# Patient Record
Sex: FEMALE | Race: WHITE | NOT HISPANIC OR LATINO | Employment: OTHER | URBAN - METROPOLITAN AREA
[De-identification: names, ages, dates, MRNs, and addresses within clinical notes are randomized per-mention and may not be internally consistent; named-entity substitution may affect disease eponyms.]

---

## 2020-02-26 ENCOUNTER — APPOINTMENT (OUTPATIENT)
Dept: WOMENS IMAGING | Facility: HOSPITAL | Age: 60
End: 2020-02-26

## 2020-02-26 PROCEDURE — 77063 BREAST TOMOSYNTHESIS BI: CPT | Performed by: RADIOLOGY

## 2020-02-26 PROCEDURE — 77067 SCR MAMMO BI INCL CAD: CPT | Performed by: RADIOLOGY

## 2021-04-24 ENCOUNTER — APPOINTMENT (OUTPATIENT)
Dept: GENERAL RADIOLOGY | Facility: HOSPITAL | Age: 61
End: 2021-04-24

## 2021-04-24 ENCOUNTER — HOSPITAL ENCOUNTER (EMERGENCY)
Facility: HOSPITAL | Age: 61
Discharge: HOME | End: 2021-04-24
Attending: EMERGENCY MEDICINE | Admitting: EMERGENCY MEDICINE

## 2021-04-24 VITALS
RESPIRATION RATE: 16 BRPM | SYSTOLIC BLOOD PRESSURE: 114 MMHG | HEIGHT: 67 IN | HEART RATE: 95 BPM | TEMPERATURE: 97.2 F | DIASTOLIC BLOOD PRESSURE: 74 MMHG | OXYGEN SATURATION: 99 % | WEIGHT: 205 LBS | BODY MASS INDEX: 32.18 KG/M2

## 2021-04-24 DIAGNOSIS — S40.022A ARM CONTUSION, LEFT, INITIAL ENCOUNTER: Primary | ICD-10-CM

## 2021-04-24 DIAGNOSIS — T07.XXXA MULTIPLE ABRASIONS: ICD-10-CM

## 2021-04-24 DIAGNOSIS — S20.212A RIB CONTUSION, LEFT, INITIAL ENCOUNTER: ICD-10-CM
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
## 2021-04-24 PROCEDURE — 73090 X-RAY EXAM OF FOREARM: CPT

## 2021-04-24 PROCEDURE — 73030 X-RAY EXAM OF SHOULDER: CPT

## 2021-04-24 PROCEDURE — 71101 X-RAY EXAM UNILAT RIBS/CHEST: CPT

## 2021-04-24 PROCEDURE — 99283 EMERGENCY DEPT VISIT LOW MDM: CPT

## 2021-04-24 NOTE — ED PROVIDER NOTES
EMERGENCY DEPARTMENT ENCOUNTER    Room Number:  10/10  Date of encounter:  4/24/2021  PCP: Saw West MD  Historian: Patient      HPI:  Chief Complaint: Fall  A complete HPI/ROS/PMH/PSH/SH/FH are unobtainable due to: None    Context: Kinga Gonzalez is a 60 y.o. female who presents to the ED c/o fall.  Patient states that she fell today after tripping while outside doing some gardening.  She rolled down 6 stairs.  She complains of pain exclusively in the left side of her body, namely her left shoulder, forearm, and ribs.  Pain is constant and mild.  Worse with movement.  She denies having any pain to her head although she does think that she hit it.  No vomiting.  Takes no blood thinners.  She has been ambulatory.  Tetanus vaccine is up-to-date.      PAST MEDICAL HISTORY  Active Ambulatory Problems     Diagnosis Date Noted   • No Active Ambulatory Problems     Resolved Ambulatory Problems     Diagnosis Date Noted   • No Resolved Ambulatory Problems     No Additional Past Medical History         PAST SURGICAL HISTORY  History reviewed. No pertinent surgical history.      FAMILY HISTORY  History reviewed. No pertinent family history.      SOCIAL HISTORY  Social History     Socioeconomic History   • Marital status: Single     Spouse name: Not on file   • Number of children: Not on file   • Years of education: Not on file   • Highest education level: Not on file   Tobacco Use   • Smoking status: Never Smoker   • Smokeless tobacco: Never Used   Vaping Use   • Vaping Use: Never used   Substance and Sexual Activity   • Alcohol use: Yes   • Drug use: Never   • Sexual activity: Defer         ALLERGIES  Penicillins        REVIEW OF SYSTEMS  Review of Systems     All systems reviewed and negative except for those discussed in HPI.       PHYSICAL EXAM    I have reviewed the triage vital signs and nursing notes.    ED Triage Vitals   Temp Heart Rate Resp BP SpO2   04/24/21 1004 04/24/21 1004 04/24/21 1004 04/24/21  1113 04/24/21 1004   97.2 °F (36.2 °C) 102 16 128/85 98 %      Temp src Heart Rate Source Patient Position BP Location FiO2 (%)   -- -- 04/24/21 1113 04/24/21 1113 --     Sitting Left arm        Physical Exam  GENERAL: not distressed  HENT: nares patent, scalp is atraumatic  GCS 15  No palpable skull fracture  No hemotympanum bilaterally  No Ely's sign  No raccoon eyes  No CSF rhinorrhea or otorrhea   EOMI  No c spine tenderness to palpation  Full range of motion of neck  EYES: no scleral icterus  CV: regular rhythm, regular rate  RESPIRATORY: normal effort, clear to auscultation bilaterally  ABDOMEN: soft, nontender  MUSCULOSKELETAL: no deformity, minimal pain of the patient of the left shoulder and left forearm, she has full range of motion with good strength of the left wrist on flexion and extension.  She has full range of motion to the left elbow with flexion, extension, pronation, and supination.  She additionally has full range of motion to her left shoulder.  No pain outpatient of the right upper extremity and bilateral lower Extremities.  No C/T/L-spine tenderness.  NEURO: alert, moves all extremities, follows commands  SKIN: Abrasions to the left toes as well as to the right hand including some distal nail involvement of the right index finger        LAB RESULTS  No results found for this or any previous visit (from the past 24 hour(s)).    Ordered the above labs and independently reviewed the results.        RADIOLOGY  XR Shoulder 2+ View Left, XR Forearm 2 View Left, XR Ribs Left With PA Chest    Result Date: 4/24/2021  The chest and left rib series, left arm, left shoulder  HISTORY left rib pain, extremity pain.  CHEST: AP view of the chest and AP and oblique views of the left hemithorax were obtained. The heart is within normal limits in size. There is no evidence of focal infiltrate, effusion, congestive failure or of pneumothorax. AP and oblique views of the left hemithorax with no evidence of a  displaced rib fracture.  Left forearm: 2 views left forearm shows no evidence of fracture or dislocation.  Left shoulder: 2 views left shoulder show no evidence of fracture or dislocation.  This report was finalized on 4/24/2021 11:25 AM by Dr. Tc López M.D.        I ordered the above noted radiological studies. Reviewed by me and discussed with radiologist.  See dictation for official radiology interpretation.      PROCEDURES    Procedures      MEDICATIONS GIVEN IN ER    Medications - No data to display      PROGRESS, DATA ANALYSIS, CONSULTS, AND MEDICAL DECISION MAKING    All labs have been independently reviewed by me.  All radiology studies have been reviewed by me and discussed with radiologist dictating the report.   EKG's independently viewed and interpreted by me.  Discussion below represents my analysis of pertinent findings related to patient's condition, differential diagnosis, treatment plan and final disposition.    Patient presents after a fall.     X-ray of the left forearm and shoulder interpreted by myself.  No evidence of fracture.    ED Course as of Apr 24 1205   Sat Apr 24, 2021   1157 I reexamined the patient prior to discharge and repeated a secondary survey.  She has absolutely no scalp tenderness.  She has no cervical spinal tenderness.  She has very minimal pain on palpation even in her left upper extremity where she complained of pain.  I discussed wound care with the patient.  I gave her return precautions.  She takes no blood thinners.  Discharge home.    [TD]      ED Course User Index  [TD] Michael Martínez II, MD           PPE: Both the patient and I wore a surgical mask throughout the entire patient encounter. I wore protective goggles.     AS OF 12:05 EDT VITALS:    BP - 128/85  HR - 102  TEMP - 97.2 °F (36.2 °C)  O2 SATS - 98%        DIAGNOSIS  Final diagnoses:   Arm contusion, left, initial encounter   Rib contusion, left, initial encounter   Multiple abrasions   Fall,  initial encounter         DISPOSITION  DISCHARGE    FOLLOW-UP  Saw West MD  [address] [place] [id number]  [phone]    Go to   As needed    [facility] Emergency Department  [address] [place] [address] [phone]  Go to   If symptoms worsen         Medication List      No changes were made to your prescriptions during this visit.                  Michael Martínez II, MD  04/24/21 [id number]

## 2021-04-24 NOTE — ED TRIAGE NOTES
Patient presents to er via private vehicle from home.  Patient had a mechanical fall down six steps striking head on left side injuring forearm and right hip.Patient was placed in face mask during first look triage.  Patient was wearing a face mask throughout encounter.  I wore personal protective equipment throughout the encounter.  Hand hygiene was performed before and after patient encounter.

## 2022-01-26 ENCOUNTER — APPOINTMENT (OUTPATIENT)
Dept: WOMENS IMAGING | Facility: HOSPITAL | Age: 62
End: 2022-01-26

## 2022-01-26 PROCEDURE — 77063 BREAST TOMOSYNTHESIS BI: CPT | Performed by: RADIOLOGY

## 2022-01-26 PROCEDURE — 77067 SCR MAMMO BI INCL CAD: CPT | Performed by: RADIOLOGY
